# Patient Record
Sex: FEMALE | Race: ASIAN | NOT HISPANIC OR LATINO
[De-identification: names, ages, dates, MRNs, and addresses within clinical notes are randomized per-mention and may not be internally consistent; named-entity substitution may affect disease eponyms.]

---

## 2017-06-15 ENCOUNTER — RESULT REVIEW (OUTPATIENT)
Age: 47
End: 2017-06-15

## 2018-07-31 ENCOUNTER — APPOINTMENT (OUTPATIENT)
Dept: FAMILY MEDICINE | Facility: CLINIC | Age: 48
End: 2018-07-31
Payer: COMMERCIAL

## 2018-07-31 ENCOUNTER — NON-APPOINTMENT (OUTPATIENT)
Age: 48
End: 2018-07-31

## 2018-07-31 VITALS
HEIGHT: 62 IN | BODY MASS INDEX: 20.43 KG/M2 | WEIGHT: 111 LBS | OXYGEN SATURATION: 97 % | HEART RATE: 66 BPM | DIASTOLIC BLOOD PRESSURE: 70 MMHG | SYSTOLIC BLOOD PRESSURE: 102 MMHG | RESPIRATION RATE: 14 BRPM | TEMPERATURE: 98.9 F

## 2018-07-31 PROCEDURE — 93000 ELECTROCARDIOGRAM COMPLETE: CPT

## 2018-07-31 PROCEDURE — 99386 PREV VISIT NEW AGE 40-64: CPT | Mod: 25

## 2018-07-31 RX ORDER — TAMOXIFEN CITRATE 20 MG/1
20 TABLET, FILM COATED ORAL
Refills: 0 | Status: ACTIVE | COMMUNITY

## 2018-07-31 NOTE — HISTORY OF PRESENT ILLNESS
[de-identified] : She presents to office for initial routine physical exam. Patient has a history of breast cancer  diagnosed in 2014  status post double mastectomy. 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 Pt currently on Tamoxifen.She also states that she has environmental and seasonal allergies which lasts for most of the year. Patient takes OTC meds with some relief. Has some nasal stuffiness itchy eyes  nose and throat

## 2018-07-31 NOTE — ASSESSMENT
[FreeTextEntry1] : She presents to office for initial routine physical exam. Patient has a history of breast cancer  diagnosed in 2014  status post double mastectomy. 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 Pt currently on Tamoxifen\par Pt and I discussed possibly Kenalog shot during the start of her allergy season (Fall)\par pt to get PE labs

## 2018-07-31 NOTE — HEALTH RISK ASSESSMENT
[Patient reported mammogram was abnormal] : Patient reported mammogram was abnormal [Patient reported PAP Smear was normal] : Patient reported PAP Smear was normal [MammogramDate] : 2014 [PapSmearDate] : 2017

## 2018-08-10 LAB — HBA1C MFR BLD HPLC: 5.3

## 2018-10-04 ENCOUNTER — RESULT REVIEW (OUTPATIENT)
Age: 48
End: 2018-10-04

## 2019-08-20 ENCOUNTER — APPOINTMENT (OUTPATIENT)
Dept: FAMILY MEDICINE | Facility: CLINIC | Age: 49
End: 2019-08-20
Payer: COMMERCIAL

## 2019-08-20 ENCOUNTER — NON-APPOINTMENT (OUTPATIENT)
Age: 49
End: 2019-08-20

## 2019-08-20 VITALS
RESPIRATION RATE: 14 BRPM | OXYGEN SATURATION: 99 % | HEART RATE: 80 BPM | SYSTOLIC BLOOD PRESSURE: 116 MMHG | DIASTOLIC BLOOD PRESSURE: 70 MMHG | HEIGHT: 62 IN | TEMPERATURE: 98.3 F | BODY MASS INDEX: 20.8 KG/M2 | WEIGHT: 113 LBS

## 2019-08-20 DIAGNOSIS — Z00.00 ENCOUNTER FOR GENERAL ADULT MEDICAL EXAMINATION W/OUT ABNORMAL FINDINGS: ICD-10-CM

## 2019-08-20 DIAGNOSIS — Z85.3 PERSONAL HISTORY OF MALIGNANT NEOPLASM OF BREAST: ICD-10-CM

## 2019-08-20 DIAGNOSIS — J30.89 OTHER ALLERGIC RHINITIS: ICD-10-CM

## 2019-08-20 PROCEDURE — 90715 TDAP VACCINE 7 YRS/> IM: CPT

## 2019-08-20 PROCEDURE — 93000 ELECTROCARDIOGRAM COMPLETE: CPT

## 2019-08-20 PROCEDURE — 90471 IMMUNIZATION ADMIN: CPT

## 2019-08-20 PROCEDURE — 99396 PREV VISIT EST AGE 40-64: CPT | Mod: 25

## 2019-08-20 NOTE — PHYSICAL EXAM
[Normal] : normal gait, coordination grossly intact, no focal deficits [de-identified] : PND in pharynx  [de-identified] : Warm, dry, intact.  No rashes.  [de-identified] : AA&Ox3

## 2019-08-20 NOTE — REVIEW OF SYSTEMS
[Nasal Discharge] : nasal discharge [Negative] : Neurological [Fever] : no fever [Chills] : no chills [Chest Pain] : no chest pain [Shortness Of Breath] : no shortness of breath [Nausea] : no nausea [Vomiting] : no vomiting [Diarrhea] : diarrhea [Headache] : no headache

## 2019-08-20 NOTE — PLAN
[FreeTextEntry1] : Health Maintenance\par - Referral for initial colonoscopy. \par - Referral for annual labs. \par - TDAP immunization given today\par - Titers for Hep B, Mesals, Mumps, Rubella, Chicken pox & PPD Quanteferon \par \par PND\par - Claritin \par - Flonase

## 2019-08-20 NOTE — HISTORY OF PRESENT ILLNESS
[de-identified] : 48y/o F with PMHx of Breast CA, Environmental and seasonal allergies presents to the office for routine annual physical exam.  \par Pt c/o intermittent congestion. BP in office is 116/70. Last pap 2018 and last eye exam UTD. FHx of thyroid disease with mother and daughter with Celiac disease. 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 Denies HA, CP, SOB, N/V/D, fever or chills.

## 2019-08-20 NOTE — ADDENDUM
[FreeTextEntry1] : I, Tesha Gunderson, acted solely as a scribe for Dr. Marroquin on this date 08/20/2019.\par \par All medical record entries made by the Scribe were at my, Dr. Marroquin, direction and personally dictated by me on 08/20/2019. I have reviewed the chart and agree that the record accurately reflects my personal performance of the history, physical exam, assessment and plan.  I have also personally directed, reviewed and agreed with the chart.

## 2020-08-07 ENCOUNTER — APPOINTMENT (OUTPATIENT)
Dept: FAMILY MEDICINE | Facility: CLINIC | Age: 50
End: 2020-08-07
Payer: COMMERCIAL

## 2020-08-07 VITALS
DIASTOLIC BLOOD PRESSURE: 70 MMHG | HEART RATE: 69 BPM | TEMPERATURE: 98.4 F | RESPIRATION RATE: 14 BRPM | OXYGEN SATURATION: 98 % | SYSTOLIC BLOOD PRESSURE: 108 MMHG

## 2020-08-07 DIAGNOSIS — K59.00 CONSTIPATION, UNSPECIFIED: ICD-10-CM

## 2020-08-07 DIAGNOSIS — Z71.89 OTHER SPECIFIED COUNSELING: ICD-10-CM

## 2020-08-07 DIAGNOSIS — Z23 ENCOUNTER FOR IMMUNIZATION: ICD-10-CM

## 2020-08-07 DIAGNOSIS — Z11.1 ENCOUNTER FOR SCREENING FOR RESPIRATORY TUBERCULOSIS: ICD-10-CM

## 2020-08-07 PROCEDURE — 86580 TB INTRADERMAL TEST: CPT

## 2020-08-07 PROCEDURE — 99213 OFFICE O/P EST LOW 20 MIN: CPT | Mod: 25

## 2020-08-07 PROCEDURE — 90471 IMMUNIZATION ADMIN: CPT

## 2020-08-07 PROCEDURE — 90716 VAR VACCINE LIVE SUBQ: CPT

## 2020-08-07 NOTE — HISTORY OF PRESENT ILLNESS
[de-identified] : 49 y/o F presents to office for immunization counseling and PPD.Pt works as an  at [employer] [place] Had titers revealing non immunity to Varicella. Pt unsure if she had chicken pox illness. Concerned about Shingles. Also requests Celiac testing as daughter recently dx with celiac.pt asymptomatic.

## 2020-10-07 ENCOUNTER — APPOINTMENT (OUTPATIENT)
Dept: FAMILY MEDICINE | Facility: CLINIC | Age: 50
End: 2020-10-07
Payer: COMMERCIAL

## 2020-10-07 PROCEDURE — 90471 IMMUNIZATION ADMIN: CPT

## 2020-10-07 PROCEDURE — 90716 VAR VACCINE LIVE SUBQ: CPT

## 2020-12-08 ENCOUNTER — NON-APPOINTMENT (OUTPATIENT)
Age: 50
End: 2020-12-08

## 2020-12-08 ENCOUNTER — APPOINTMENT (OUTPATIENT)
Dept: FAMILY MEDICINE | Facility: CLINIC | Age: 50
End: 2020-12-08
Payer: COMMERCIAL

## 2020-12-08 VITALS
HEIGHT: 62 IN | SYSTOLIC BLOOD PRESSURE: 118 MMHG | WEIGHT: 110 LBS | OXYGEN SATURATION: 96 % | BODY MASS INDEX: 20.24 KG/M2 | DIASTOLIC BLOOD PRESSURE: 86 MMHG | HEART RATE: 67 BPM | RESPIRATION RATE: 16 BRPM

## 2020-12-08 DIAGNOSIS — R07.89 OTHER CHEST PAIN: ICD-10-CM

## 2020-12-08 PROCEDURE — 93000 ELECTROCARDIOGRAM COMPLETE: CPT | Mod: 59

## 2020-12-08 PROCEDURE — 99213 OFFICE O/P EST LOW 20 MIN: CPT | Mod: 25

## 2020-12-08 PROCEDURE — 99072 ADDL SUPL MATRL&STAF TM PHE: CPT

## 2020-12-08 NOTE — HISTORY OF PRESENT ILLNESS
[FreeTextEntry8] : 51 y/o F PMHx Breast CA (Tamoxifen) presents to office for 2 weeks hx of chest tightness. Started in the middle of her chest and times moves to the sides of her chest and around right armpit.Denies SOB. Under stress when started.Lasts about an hour. Father a-fib, sister arrhythmia.

## 2021-02-08 ENCOUNTER — NON-APPOINTMENT (OUTPATIENT)
Age: 51
End: 2021-02-08

## 2021-02-10 ENCOUNTER — APPOINTMENT (OUTPATIENT)
Dept: GASTROENTEROLOGY | Facility: CLINIC | Age: 51
End: 2021-02-10
Payer: COMMERCIAL

## 2021-02-10 VITALS
WEIGHT: 109 LBS | DIASTOLIC BLOOD PRESSURE: 78 MMHG | OXYGEN SATURATION: 98 % | BODY MASS INDEX: 20.06 KG/M2 | SYSTOLIC BLOOD PRESSURE: 112 MMHG | HEART RATE: 69 BPM | HEIGHT: 62 IN | TEMPERATURE: 97.1 F | RESPIRATION RATE: 16 BRPM

## 2021-02-10 DIAGNOSIS — E73.9 LACTOSE INTOLERANCE, UNSPECIFIED: ICD-10-CM

## 2021-02-10 DIAGNOSIS — R12 HEARTBURN: ICD-10-CM

## 2021-02-10 DIAGNOSIS — Z78.9 OTHER SPECIFIED HEALTH STATUS: ICD-10-CM

## 2021-02-10 DIAGNOSIS — Z12.11 ENCOUNTER FOR SCREENING FOR MALIGNANT NEOPLASM OF COLON: ICD-10-CM

## 2021-02-10 PROCEDURE — 99202 OFFICE O/P NEW SF 15 MIN: CPT

## 2021-02-10 PROCEDURE — 99072 ADDL SUPL MATRL&STAF TM PHE: CPT

## 2021-02-10 RX ORDER — SODIUM SULFATE, POTASSIUM SULFATE, MAGNESIUM SULFATE 17.5; 3.13; 1.6 G/ML; G/ML; G/ML
17.5-3.13-1.6 SOLUTION, CONCENTRATE ORAL
Qty: 1 | Refills: 0 | Status: ACTIVE | COMMUNITY
Start: 2021-02-10 | End: 1900-01-01

## 2021-02-10 NOTE — HISTORY OF PRESENT ILLNESS
[FreeTextEntry1] : This is a pleasant 50-year-old female with history of breast cancer status post bilateral mastectomy referred by Dr. Zohra Marroquin for consultation for colon cancer screening.  She has never had a lower GI evaluation.  She relates that for the past several months she has been having occasional heartburn symptoms manifesting as atypical chest pain worse when she lies down.  She denies dysphagia or odynophagia.  She has been taking omeprazole over-the-counter with good response.  She denies abdominal pain, nausea or vomiting.  She states to have lactose intolerance and that dairy products upsets her stomach.  She denies changes in bowel habits.  She denies rectal bleeding or melena.  She denies weight loss.

## 2021-02-10 NOTE — PHYSICAL EXAM
[General Appearance - Alert] : alert [General Appearance - In No Acute Distress] : in no acute distress [Sclera] : the sclera and conjunctiva were normal [Both Tympanic Membranes Were Examined] : both tympanic membranes were normal [Neck Appearance] : the appearance of the neck was normal [Neck Cervical Mass (___cm)] : no neck mass was observed [Jugular Venous Distention Increased] : there was no jugular-venous distention [Thyroid Diffuse Enlargement] : the thyroid was not enlarged [Thyroid Nodule] : there were no palpable thyroid nodules [Auscultation Breath Sounds / Voice Sounds] : lungs were clear to auscultation bilaterally [Heart Rate And Rhythm] : heart rate was normal and rhythm regular [Heart Sounds Gallop] : no gallops [Heart Sounds] : normal S1 and S2 [Murmurs] : no murmurs [Heart Sounds Pericardial Friction Rub] : no pericardial rub [Edema] : there was no peripheral edema [Bowel Sounds] : normal bowel sounds [Abdomen Soft] : soft [Abdomen Tenderness] : non-tender [] : no hepato-splenomegaly [Abdomen Mass (___ Cm)] : no abdominal mass palpated [Skin Color & Pigmentation] : normal skin color and pigmentation [No Focal Deficits] : no focal deficits

## 2021-02-10 NOTE — ASSESSMENT
[FreeTextEntry1] : This is a 50-year-old female with history of breast cancer presenting for colon cancer screening evaluation.  I explained to her the risks, alternatives and benefits to a colonoscopy.  Risk including but not limited to bleeding, perforation, infection and adverse medication reaction.  Multiple questions were answered.  She asked about alternative of fecal DNA stool testing.  I explained to her the meaning of Cologuard.  She is opting for conventional colonoscopy.  For the past several months she has been having occasional heartburn symptoms responding to over-the-counter Pepcid.  She has no alarm symptoms of dysphagia, odynophagia, nausea, vomiting.  I recommend eating small meals and avoid laying down after eating.  I recommend avoiding foods that can cause heartburn such as caffeinated beverages, spicy foods and acidic foods.  I explained to her the meaning of lactose intolerance.  I recommend limiting her intake of dairy products.

## 2021-02-22 ENCOUNTER — NON-APPOINTMENT (OUTPATIENT)
Age: 51
End: 2021-02-22

## 2021-03-21 DIAGNOSIS — Z01.818 ENCOUNTER FOR OTHER PREPROCEDURAL EXAMINATION: ICD-10-CM

## 2021-03-23 ENCOUNTER — APPOINTMENT (OUTPATIENT)
Dept: DISASTER EMERGENCY | Facility: CLINIC | Age: 51
End: 2021-03-23

## 2021-03-24 LAB — SARS-COV-2 N GENE NPH QL NAA+PROBE: NOT DETECTED

## 2021-03-26 ENCOUNTER — APPOINTMENT (OUTPATIENT)
Dept: GASTROENTEROLOGY | Facility: AMBULATORY MEDICAL SERVICES | Age: 51
End: 2021-03-26
Payer: COMMERCIAL

## 2021-03-26 PROCEDURE — 45378 DIAGNOSTIC COLONOSCOPY: CPT | Mod: 33

## 2021-04-23 ENCOUNTER — RESULT REVIEW (OUTPATIENT)
Age: 51
End: 2021-04-23

## 2021-04-23 ENCOUNTER — NON-APPOINTMENT (OUTPATIENT)
Age: 51
End: 2021-04-23

## 2021-05-03 ENCOUNTER — NON-APPOINTMENT (OUTPATIENT)
Age: 51
End: 2021-05-03

## 2021-05-17 ENCOUNTER — NON-APPOINTMENT (OUTPATIENT)
Age: 51
End: 2021-05-17

## 2021-09-17 ENCOUNTER — APPOINTMENT (OUTPATIENT)
Dept: RADIOLOGY | Facility: CLINIC | Age: 51
End: 2021-09-17
Payer: COMMERCIAL

## 2021-09-17 ENCOUNTER — TRANSCRIPTION ENCOUNTER (OUTPATIENT)
Age: 51
End: 2021-09-17

## 2021-09-17 PROCEDURE — 77080 DXA BONE DENSITY AXIAL: CPT

## 2021-09-20 ENCOUNTER — APPOINTMENT (OUTPATIENT)
Dept: ULTRASOUND IMAGING | Facility: CLINIC | Age: 51
End: 2021-09-20
Payer: COMMERCIAL

## 2021-09-20 PROCEDURE — 76641 ULTRASOUND BREAST COMPLETE: CPT | Mod: 50

## 2021-12-13 ENCOUNTER — APPOINTMENT (OUTPATIENT)
Dept: MRI IMAGING | Facility: CLINIC | Age: 51
End: 2021-12-13
Payer: COMMERCIAL

## 2021-12-13 ENCOUNTER — OUTPATIENT (OUTPATIENT)
Dept: OUTPATIENT SERVICES | Facility: HOSPITAL | Age: 51
LOS: 1 days | End: 2021-12-13
Payer: COMMERCIAL

## 2021-12-13 DIAGNOSIS — Z00.8 ENCOUNTER FOR OTHER GENERAL EXAMINATION: ICD-10-CM

## 2021-12-13 PROCEDURE — A9585: CPT

## 2021-12-13 PROCEDURE — 77049 MRI BREAST C-+ W/CAD BI: CPT | Mod: 26

## 2021-12-13 PROCEDURE — C8937: CPT

## 2021-12-13 PROCEDURE — C8908: CPT

## 2022-01-01 NOTE — HEALTH RISK ASSESSMENT
[Patient reported PAP Smear was normal] : Patient reported PAP Smear was normal [PapSmearComments] : 2018 [MammogramComments] : B/L mastectomy 2014 [ColonoscopyComments] : referral today without difficulty

## 2022-01-25 ENCOUNTER — TRANSCRIPTION ENCOUNTER (OUTPATIENT)
Age: 52
End: 2022-01-25

## 2022-06-23 ENCOUNTER — APPOINTMENT (OUTPATIENT)
Dept: ORTHOPEDIC SURGERY | Facility: CLINIC | Age: 52
End: 2022-06-23
Payer: COMMERCIAL

## 2022-06-23 DIAGNOSIS — M79.645 PAIN IN LEFT FINGER(S): ICD-10-CM

## 2022-06-23 PROCEDURE — 99203 OFFICE O/P NEW LOW 30 MIN: CPT

## 2022-06-23 PROCEDURE — 73140 X-RAY EXAM OF FINGER(S): CPT

## 2022-06-23 RX ORDER — COVID-19 ANTIGEN TEST
KIT MISCELLANEOUS
Qty: 2 | Refills: 0 | Status: COMPLETED | COMMUNITY
Start: 2022-02-01

## 2022-06-23 RX ORDER — LEVOTHYROXINE SODIUM 0.03 MG/1
25 TABLET ORAL
Qty: 90 | Refills: 0 | Status: COMPLETED | COMMUNITY
Start: 2022-03-31

## 2022-06-23 NOTE — PHYSICAL EXAM
[de-identified] : Patient is WDWN, alert, and in no acute distress. Breathing is unlabored. She is grossly oriented to person, place, and time.\par \par Left Hand:\par Pain with full finger flexion [de-identified] : 6/23/22 Imaging left hand/finger\par left index Xrays 3 views reveal\par No fracture\par Soft tissue swelling at PIP

## 2022-06-23 NOTE — DISCUSSION/SUMMARY
[de-identified] : The underlying pathophysiology was reviewed with the patient. XR films were reviewed with the patient. Discussed at length the nature of the patient’s condition. The left hand symptoms appear secondary to soft tissue swelling. No fracture. \par \par She was advised to  make a fist each day to regain the ROM. \par Can soak the hand in warm water in the morning\par If painful, can ice . At this stage, ice will not reduce swelling but will help with pain\par Pumping the hand will help with swelling more than icing would.\par All questions answered, understanding verbalized. Patient in agreement with plan of care. Follow up if needed

## 2022-06-24 VITALS — WEIGHT: 110 LBS | HEIGHT: 62 IN | BODY MASS INDEX: 20.24 KG/M2

## 2022-09-13 ENCOUNTER — RESULT REVIEW (OUTPATIENT)
Age: 52
End: 2022-09-13

## 2022-09-27 ENCOUNTER — NON-APPOINTMENT (OUTPATIENT)
Age: 52
End: 2022-09-27

## 2023-02-07 ENCOUNTER — APPOINTMENT (OUTPATIENT)
Dept: RADIOLOGY | Facility: CLINIC | Age: 53
End: 2023-02-07
Payer: COMMERCIAL

## 2023-02-07 PROCEDURE — 77080 DXA BONE DENSITY AXIAL: CPT

## 2023-09-07 ENCOUNTER — APPOINTMENT (OUTPATIENT)
Dept: FAMILY MEDICINE | Facility: CLINIC | Age: 53
End: 2023-09-07
Payer: COMMERCIAL

## 2023-09-07 ENCOUNTER — APPOINTMENT (OUTPATIENT)
Dept: FAMILY MEDICINE | Facility: CLINIC | Age: 53
End: 2023-09-07

## 2023-09-07 ENCOUNTER — NON-APPOINTMENT (OUTPATIENT)
Age: 53
End: 2023-09-07

## 2023-09-07 DIAGNOSIS — U07.1 COVID-19: ICD-10-CM

## 2023-09-07 PROCEDURE — 99213 OFFICE O/P EST LOW 20 MIN: CPT | Mod: 95

## 2023-09-07 RX ORDER — NIRMATRELVIR AND RITONAVIR 300-100 MG
20 X 150 MG & KIT ORAL
Qty: 30 | Refills: 0 | Status: ACTIVE | COMMUNITY
Start: 2023-09-07 | End: 1900-01-01

## 2023-09-07 NOTE — HISTORY OF PRESENT ILLNESS
[Home] : at home, [unfilled] , at the time of the visit. [Medical Office: ([facility])___] : at the medical office located in  [Verbal consent obtained from patient] : the patient, [unfilled] [FreeTextEntry8] : 54 y/o F PMHx Breast CA (Tamoxifen), Hypothyroid (Synthroid) Presents via Telehealth after testing + Covid tpday. Sx started yesterday with ST,fatigue URI sx.

## 2023-09-07 NOTE — REVIEW OF SYSTEMS
[Fever] : no fever [Chills] : chills [Sore Throat] : sore throat [Postnasal Drip] : postnasal drip [Cough] : cough [Negative] : Cardiovascular

## 2023-09-25 ENCOUNTER — APPOINTMENT (OUTPATIENT)
Dept: FAMILY MEDICINE | Facility: CLINIC | Age: 53
End: 2023-09-25
Payer: COMMERCIAL

## 2023-09-25 VITALS
HEART RATE: 75 BPM | SYSTOLIC BLOOD PRESSURE: 110 MMHG | RESPIRATION RATE: 16 BRPM | TEMPERATURE: 97.3 F | DIASTOLIC BLOOD PRESSURE: 72 MMHG | OXYGEN SATURATION: 97 %

## 2023-09-25 DIAGNOSIS — H10.9 UNSPECIFIED CONJUNCTIVITIS: ICD-10-CM

## 2023-09-25 DIAGNOSIS — B30.9 VIRAL CONJUNCTIVITIS, UNSPECIFIED: ICD-10-CM

## 2023-09-25 DIAGNOSIS — M54.2 CERVICALGIA: ICD-10-CM

## 2023-09-25 PROCEDURE — 99214 OFFICE O/P EST MOD 30 MIN: CPT

## 2023-09-25 RX ORDER — TOBRAMYCIN AND DEXAMETHASONE 3; 1 MG/ML; MG/ML
0.3-0.1 SUSPENSION/ DROPS OPHTHALMIC EVERY 4 HOURS
Qty: 1 | Refills: 0 | Status: ACTIVE | COMMUNITY
Start: 2023-09-25 | End: 1900-01-01

## 2023-09-25 RX ORDER — DICLOFENAC SODIUM 50 MG/1
50 TABLET, DELAYED RELEASE ORAL
Qty: 10 | Refills: 1 | Status: ACTIVE | COMMUNITY
Start: 2023-09-25 | End: 1900-01-01

## 2023-09-25 RX ORDER — CYCLOBENZAPRINE HYDROCHLORIDE 5 MG/1
5 TABLET, FILM COATED ORAL
Qty: 20 | Refills: 0 | Status: ACTIVE | COMMUNITY
Start: 2023-09-25 | End: 1900-01-01

## 2024-04-11 ENCOUNTER — APPOINTMENT (OUTPATIENT)
Dept: FAMILY MEDICINE | Facility: CLINIC | Age: 54
End: 2024-04-11
Payer: COMMERCIAL

## 2024-04-11 VITALS
HEIGHT: 62 IN | RESPIRATION RATE: 16 BRPM | DIASTOLIC BLOOD PRESSURE: 80 MMHG | WEIGHT: 110 LBS | SYSTOLIC BLOOD PRESSURE: 122 MMHG | BODY MASS INDEX: 20.24 KG/M2 | TEMPERATURE: 96 F | HEART RATE: 82 BPM | OXYGEN SATURATION: 98 %

## 2024-04-11 DIAGNOSIS — J01.80 OTHER ACUTE SINUSITIS: ICD-10-CM

## 2024-04-11 DIAGNOSIS — H10.44 VERNAL CONJUNCTIVITIS: ICD-10-CM

## 2024-04-11 PROCEDURE — 99213 OFFICE O/P EST LOW 20 MIN: CPT

## 2024-04-11 RX ORDER — OFLOXACIN 3 MG/ML
0.3 SOLUTION/ DROPS OPHTHALMIC 4 TIMES DAILY
Qty: 1 | Refills: 1 | Status: ACTIVE | COMMUNITY
Start: 2024-04-11 | End: 1900-01-01

## 2024-04-11 RX ORDER — DOXYCYCLINE HYCLATE 100 MG/1
100 TABLET ORAL TWICE DAILY
Qty: 14 | Refills: 1 | Status: ACTIVE | COMMUNITY
Start: 2024-04-11 | End: 1900-01-01

## 2024-04-11 NOTE — PHYSICAL EXAM
[Normal] : no posterior cervical lymphadenopathy and no anterior cervical lymphadenopathy [de-identified] : right eye injected  [de-identified] : throat injected mucus noted

## 2024-04-11 NOTE — HISTORY OF PRESENT ILLNESS
[FreeTextEntry8] : 53 y/.o F Presents for 2 days of right eye crusty, red mildly itchy., HAs sinus congestion. Yellow clear nasal discharge. Takes Allegra for allergies. Also c/.o ST and sinus pain

## 2024-04-11 NOTE — REVIEW OF SYSTEMS
[Fever] : no fever [Chills] : no chills [Earache] : no earache [Nasal Discharge] : nasal discharge [Sore Throat] : sore throat [Postnasal Drip] : postnasal drip [Negative] : Respiratory

## 2024-04-20 NOTE — HISTORY OF PRESENT ILLNESS
Spoke with RN.  Stated patient fairly tired today, will attempt to evaluate tomorrow-discussed with RN which was in agreement to plan.   [de-identified] : Pt is a 50 y/o RHD female with left index finger pain x 3 weeks.  She jammed the finger with a volleyball at that time. She had pain and mild swelling. She is unable to make a fist without pain. She did not go to the ED or Urgent Care. She is not taking any pain medication.  She states that the pain has persisted.  She has difficulty completely flexing the finger.  She localizes the pain to the proximal phalanx.\par

## 2024-08-02 ENCOUNTER — APPOINTMENT (OUTPATIENT)
Dept: RADIOLOGY | Facility: CLINIC | Age: 54
End: 2024-08-02
Payer: COMMERCIAL

## 2024-08-02 PROCEDURE — 77085 DXA BONE DENSITY AXL VRT FX: CPT

## 2024-09-16 DIAGNOSIS — Z00.00 ENCOUNTER FOR GENERAL ADULT MEDICAL EXAMINATION W/OUT ABNORMAL FINDINGS: ICD-10-CM

## 2024-10-08 ENCOUNTER — NON-APPOINTMENT (OUTPATIENT)
Age: 54
End: 2024-10-08

## 2024-10-08 ENCOUNTER — APPOINTMENT (OUTPATIENT)
Dept: FAMILY MEDICINE | Facility: CLINIC | Age: 54
End: 2024-10-08
Payer: COMMERCIAL

## 2024-10-08 VITALS
SYSTOLIC BLOOD PRESSURE: 120 MMHG | DIASTOLIC BLOOD PRESSURE: 82 MMHG | BODY MASS INDEX: 21.71 KG/M2 | TEMPERATURE: 98.2 F | RESPIRATION RATE: 16 BRPM | OXYGEN SATURATION: 98 % | HEART RATE: 76 BPM | WEIGHT: 118 LBS | HEIGHT: 62 IN

## 2024-10-08 DIAGNOSIS — Z00.00 ENCOUNTER FOR GENERAL ADULT MEDICAL EXAMINATION W/OUT ABNORMAL FINDINGS: ICD-10-CM

## 2024-10-08 DIAGNOSIS — E03.9 HYPOTHYROIDISM, UNSPECIFIED: ICD-10-CM

## 2024-10-08 DIAGNOSIS — M25.562 PAIN IN RIGHT KNEE: ICD-10-CM

## 2024-10-08 DIAGNOSIS — Z23 ENCOUNTER FOR IMMUNIZATION: ICD-10-CM

## 2024-10-08 DIAGNOSIS — M25.561 PAIN IN RIGHT KNEE: ICD-10-CM

## 2024-10-08 PROCEDURE — 99396 PREV VISIT EST AGE 40-64: CPT | Mod: 25

## 2024-10-08 PROCEDURE — G0008: CPT

## 2024-10-08 PROCEDURE — 93000 ELECTROCARDIOGRAM COMPLETE: CPT

## 2024-10-08 PROCEDURE — 90656 IIV3 VACC NO PRSV 0.5 ML IM: CPT

## 2025-02-04 ENCOUNTER — APPOINTMENT (OUTPATIENT)
Dept: FAMILY MEDICINE | Facility: CLINIC | Age: 55
End: 2025-02-04

## 2025-02-04 VITALS
OXYGEN SATURATION: 96 % | DIASTOLIC BLOOD PRESSURE: 80 MMHG | SYSTOLIC BLOOD PRESSURE: 122 MMHG | HEIGHT: 62 IN | TEMPERATURE: 97.7 F | WEIGHT: 115 LBS | BODY MASS INDEX: 21.16 KG/M2 | HEART RATE: 68 BPM | RESPIRATION RATE: 16 BRPM

## 2025-02-04 DIAGNOSIS — M25.561 PAIN IN RIGHT KNEE: ICD-10-CM

## 2025-02-04 DIAGNOSIS — G89.29 PAIN IN RIGHT KNEE: ICD-10-CM

## 2025-02-04 DIAGNOSIS — M25.562 PAIN IN RIGHT KNEE: ICD-10-CM

## 2025-02-04 PROCEDURE — G2211 COMPLEX E/M VISIT ADD ON: CPT | Mod: NC

## 2025-02-04 PROCEDURE — 99214 OFFICE O/P EST MOD 30 MIN: CPT

## 2025-02-17 PROBLEM — H10.30 ACUTE CONJUNCTIVITIS: Status: ACTIVE | Noted: 2025-02-17

## 2025-02-17 PROBLEM — Z97.3 WEARS CONTACT LENSES: Status: ACTIVE | Noted: 2025-02-17

## 2025-02-17 PROBLEM — H10.31 ACUTE BACTERIAL CONJUNCTIVITIS OF RIGHT EYE: Status: ACTIVE | Noted: 2025-02-17

## 2025-08-13 DIAGNOSIS — M25.561 PAIN IN RIGHT KNEE: ICD-10-CM

## 2025-08-13 DIAGNOSIS — M25.562 PAIN IN RIGHT KNEE: ICD-10-CM
